# Patient Record
Sex: MALE | Race: WHITE | NOT HISPANIC OR LATINO
[De-identification: names, ages, dates, MRNs, and addresses within clinical notes are randomized per-mention and may not be internally consistent; named-entity substitution may affect disease eponyms.]

---

## 2018-04-26 PROBLEM — Z00.00 ENCOUNTER FOR PREVENTIVE HEALTH EXAMINATION: Status: ACTIVE | Noted: 2018-04-26

## 2018-05-02 ENCOUNTER — APPOINTMENT (OUTPATIENT)
Dept: OTOLARYNGOLOGY | Facility: CLINIC | Age: 28
End: 2018-05-02
Payer: COMMERCIAL

## 2018-05-02 VITALS
TEMPERATURE: 97.3 F | DIASTOLIC BLOOD PRESSURE: 78 MMHG | SYSTOLIC BLOOD PRESSURE: 119 MMHG | BODY MASS INDEX: 22.4 KG/M2 | HEIGHT: 71 IN | OXYGEN SATURATION: 99 % | WEIGHT: 160 LBS | HEART RATE: 61 BPM

## 2018-05-02 DIAGNOSIS — Z81.1 FAMILY HISTORY OF ALCOHOL ABUSE AND DEPENDENCE: ICD-10-CM

## 2018-05-02 DIAGNOSIS — Z86.69 PERSONAL HISTORY OF OTHER DISEASES OF THE NERVOUS SYSTEM AND SENSE ORGANS: ICD-10-CM

## 2018-05-02 DIAGNOSIS — Z80.9 FAMILY HISTORY OF MALIGNANT NEOPLASM, UNSPECIFIED: ICD-10-CM

## 2018-05-02 DIAGNOSIS — H93.8X9 OTHER SPECIFIED DISORDERS OF EAR, UNSPECIFIED EAR: ICD-10-CM

## 2018-05-02 DIAGNOSIS — Z78.9 OTHER SPECIFIED HEALTH STATUS: ICD-10-CM

## 2018-05-02 PROCEDURE — 92557 COMPREHENSIVE HEARING TEST: CPT

## 2018-05-02 PROCEDURE — 92550 TYMPANOMETRY & REFLEX THRESH: CPT

## 2018-05-02 PROCEDURE — 99204 OFFICE O/P NEW MOD 45 MIN: CPT | Mod: 25

## 2018-05-02 PROCEDURE — 69220 CLEAN OUT MASTOID CAVITY: CPT

## 2019-01-08 ENCOUNTER — APPOINTMENT (OUTPATIENT)
Dept: OTOLARYNGOLOGY | Facility: CLINIC | Age: 29
End: 2019-01-08
Payer: COMMERCIAL

## 2019-01-08 VITALS
HEART RATE: 63 BPM | OXYGEN SATURATION: 99 % | SYSTOLIC BLOOD PRESSURE: 122 MMHG | TEMPERATURE: 98 F | HEIGHT: 71 IN | WEIGHT: 160 LBS | BODY MASS INDEX: 22.4 KG/M2 | DIASTOLIC BLOOD PRESSURE: 75 MMHG

## 2019-01-08 PROCEDURE — 99214 OFFICE O/P EST MOD 30 MIN: CPT | Mod: 25

## 2019-01-08 PROCEDURE — 69220 CLEAN OUT MASTOID CAVITY: CPT

## 2019-01-08 NOTE — HISTORY OF PRESENT ILLNESS
[de-identified] : R ear cholesteatoma excision w/ OCR age 16 w/ recurrence less than a year later with no trouble since then until a few months ago when he began hearing "crinkling" noises and an underwater sensation in the R ear (which resolved 3d ago). Longstanding nonpulsatile R side tinnitus and hearing is not as good as the left. Returns for recheck and mastoid cleaning.

## 2019-01-08 NOTE — PHYSICAL EXAM
[Binocular Microscopic Exam] : Binocular microscopic exam was performed [Normal] : the left middle ear was normal [FreeTextEntry8] : shallow mastoid bowl cleaned of cerumen w/ a hook [de-identified] : head of prosthesis visible against the TM

## 2019-07-09 ENCOUNTER — APPOINTMENT (OUTPATIENT)
Dept: OTOLARYNGOLOGY | Facility: CLINIC | Age: 29
End: 2019-07-09

## 2019-12-05 ENCOUNTER — APPOINTMENT (OUTPATIENT)
Dept: OTOLARYNGOLOGY | Facility: CLINIC | Age: 29
End: 2019-12-05
Payer: COMMERCIAL

## 2019-12-05 VITALS
WEIGHT: 160 LBS | HEIGHT: 71 IN | BODY MASS INDEX: 22.4 KG/M2 | OXYGEN SATURATION: 98 % | DIASTOLIC BLOOD PRESSURE: 88 MMHG | TEMPERATURE: 97.9 F | HEART RATE: 63 BPM | SYSTOLIC BLOOD PRESSURE: 138 MMHG

## 2019-12-05 PROCEDURE — 69220 CLEAN OUT MASTOID CAVITY: CPT

## 2019-12-05 PROCEDURE — 99214 OFFICE O/P EST MOD 30 MIN: CPT | Mod: 25

## 2019-12-05 PROCEDURE — 92550 TYMPANOMETRY & REFLEX THRESH: CPT

## 2019-12-05 PROCEDURE — 92557 COMPREHENSIVE HEARING TEST: CPT

## 2019-12-05 NOTE — HISTORY OF PRESENT ILLNESS
[de-identified] : R ear cholesteatoma excision w/ OCR age 16 w/ recurrence less than a year later. Longstanding nonpulsatile R side tinnitus and hearing is not as good as the left. Returns for recheck and mastoid cleaning- no changes, pain, drainage or other complaints.

## 2019-12-05 NOTE — PHYSICAL EXAM
[Binocular Microscopic Exam] : Binocular microscopic exam was performed [Normal] : the left middle ear was normal [FreeTextEntry8] : shallow mastoid bowl cleaned of cerumen w/ a hook & suction [de-identified] : head of prosthesis visible against the TM

## 2020-10-06 ENCOUNTER — APPOINTMENT (OUTPATIENT)
Dept: OTOLARYNGOLOGY | Facility: CLINIC | Age: 30
End: 2020-10-06
Payer: COMMERCIAL

## 2020-10-06 VITALS
SYSTOLIC BLOOD PRESSURE: 118 MMHG | HEIGHT: 71 IN | TEMPERATURE: 96.5 F | DIASTOLIC BLOOD PRESSURE: 72 MMHG | HEART RATE: 64 BPM | BODY MASS INDEX: 21.7 KG/M2 | WEIGHT: 155 LBS | OXYGEN SATURATION: 98 %

## 2020-10-06 PROCEDURE — 92557 COMPREHENSIVE HEARING TEST: CPT

## 2020-10-06 PROCEDURE — 99214 OFFICE O/P EST MOD 30 MIN: CPT | Mod: 25

## 2020-10-06 PROCEDURE — 92550 TYMPANOMETRY & REFLEX THRESH: CPT

## 2020-10-06 PROCEDURE — 69222 CLEAN OUT MASTOID CAVITY: CPT

## 2020-10-06 NOTE — DATA REVIEWED
[de-identified] : 5/18: slight R CHL & R HFHL; type C AD, A AS\par 12/19: stable\par today: stable

## 2020-10-06 NOTE — PHYSICAL EXAM
[Binocular Microscopic Exam] : Binocular microscopic exam was performed [Normal] : the left middle ear was normal [FreeTextEntry8] : mastoid bowl cleaned of cerumen w/ a hook & suction; additional cerumen directly on the TM removed after pretx w/ H2O2 [de-identified] : head of prosthesis visible against the TM. Posterior retraction pocket apposed to bluish structure (? sigmoid); this was well everted w/ a valsalva

## 2020-10-06 NOTE — HISTORY OF PRESENT ILLNESS
[de-identified] : R ear cholesteatoma excision w/ OCR age 16 w/ recurrence less than a year later. Longstanding nonpulsatile R side tinnitus and hearing is not as good as the left. Today he presents a new complaint of ~3 wks of R side pulsatile tinnitus that resolves with a valsalva. Also for a mastoid cleaning- no pain, drainage or other complaints. \par \par Covid-19 screening questions: \par   Sick contacts: no\par   Recent international travel: no\par   Shortness of breath: no\par   New or productive cough: no\par   Fevers/chills/night sweats: no\par   COVID-19 testing: neg swab 9/20 & Ab in the past\par

## 2020-10-06 NOTE — ASSESSMENT
[FreeTextEntry1] : Discussed today's findings which includes possible apposition of a posterior retraction pocket onto an exposed sigmoid sinus; CT temporal bone & RTC

## 2020-11-24 ENCOUNTER — TRANSCRIPTION ENCOUNTER (OUTPATIENT)
Age: 30
End: 2020-11-24

## 2021-01-21 ENCOUNTER — APPOINTMENT (OUTPATIENT)
Dept: OTOLARYNGOLOGY | Facility: CLINIC | Age: 31
End: 2021-01-21
Payer: COMMERCIAL

## 2021-01-21 VITALS
OXYGEN SATURATION: 98 % | HEIGHT: 71 IN | WEIGHT: 155 LBS | DIASTOLIC BLOOD PRESSURE: 73 MMHG | BODY MASS INDEX: 21.7 KG/M2 | SYSTOLIC BLOOD PRESSURE: 116 MMHG | TEMPERATURE: 97.3 F | HEART RATE: 76 BPM

## 2021-01-21 PROCEDURE — 99072 ADDL SUPL MATRL&STAF TM PHE: CPT

## 2021-01-21 PROCEDURE — 69220 CLEAN OUT MASTOID CAVITY: CPT

## 2021-01-21 PROCEDURE — 99214 OFFICE O/P EST MOD 30 MIN: CPT | Mod: 25

## 2021-01-21 NOTE — DATA REVIEWED
[de-identified] : 5/18: slight R CHL & R HFHL; type C AD, A AS\par 12/19: stable\par 10/20: stable [de-identified] : CT t-bones 10/20 reviewed primarily w/ pt: s/p R CWU mastoid w/ intact OCR

## 2021-01-21 NOTE — PHYSICAL EXAM
[Binocular Microscopic Exam] : Binocular microscopic exam was performed [Normal] : the left middle ear was normal [FreeTextEntry8] : mastoid bowl cleaned of cerumen w/ a hook & suction [de-identified] : head of prosthesis visible against the TM.

## 2021-01-21 NOTE — HISTORY OF PRESENT ILLNESS
[de-identified] : R ear cholesteatoma excision w/ OCR age 16 w/ recurrence less than a year later. Longstanding nonpulsatile R side tinnitus and hearing is not as good as the left. Now f/u CT for 4 months of short-lived R side pulsatile tinnitus that resolves with a valsalva; this is unchanged. Also for a mastoid cleaning- no pain, drainage or other complaints. \par \par Covid-19 screening questions: \par   Sick contacts: no\par   Recent international travel: no\par   Shortness of breath: no\par   New or productive cough: no\par   Fevers/chills/night sweats: no\par   COVID-19 testing: neg swab 9/20 & Ab in the past\par

## 2021-01-21 NOTE — ASSESSMENT
[FreeTextEntry1] : Stable pulsatile tinnitus w/ nl CT & sxs resolved w/ Valsalva; will continue to follow. RTC 6 months for f/u audio & cleaning

## 2021-11-22 ENCOUNTER — APPOINTMENT (OUTPATIENT)
Dept: OTOLARYNGOLOGY | Facility: CLINIC | Age: 31
End: 2021-11-22

## 2022-01-27 ENCOUNTER — APPOINTMENT (OUTPATIENT)
Dept: OTOLARYNGOLOGY | Facility: CLINIC | Age: 32
End: 2022-01-27
Payer: MEDICAID

## 2022-01-27 VITALS
SYSTOLIC BLOOD PRESSURE: 130 MMHG | HEART RATE: 56 BPM | HEIGHT: 71 IN | DIASTOLIC BLOOD PRESSURE: 85 MMHG | OXYGEN SATURATION: 100 % | BODY MASS INDEX: 23.1 KG/M2 | TEMPERATURE: 96.9 F | WEIGHT: 165 LBS

## 2022-01-27 PROCEDURE — 69222 CLEAN OUT MASTOID CAVITY: CPT

## 2022-01-27 PROCEDURE — 99214 OFFICE O/P EST MOD 30 MIN: CPT | Mod: 25

## 2022-01-27 NOTE — HISTORY OF PRESENT ILLNESS
[de-identified] : R ear cholesteatoma excision w/ OCR age 16 w/ recurrence less than a year later. Longstanding nonpulsatile R side tinnitus and hearing is not as good as the left- this seems stable. Ongoing stable short-lived R side pulsatile tinnitus since 2019 that resolves with a valsalva; this is unchanged. Also for a mastoid cleaning- no pain, drainage or other complaints. \par

## 2022-01-27 NOTE — ASSESSMENT
[FreeTextEntry1] : Discussed options including ongoing monitoring but given concern re: recurrent cholesteatoma I asked him to seek an opinon from Dr. Davis.

## 2022-01-27 NOTE — DATA REVIEWED
[de-identified] : 5/18: slight R CHL & R HFHL; type C AD, A AS\par 12/19: stable\par 10/20: stable [de-identified] : CT t-bones 10/20: s/p R CWU mastoid w/ intact OCR

## 2022-02-01 ENCOUNTER — APPOINTMENT (OUTPATIENT)
Dept: OTOLARYNGOLOGY | Facility: CLINIC | Age: 32
End: 2022-02-01

## 2022-02-22 ENCOUNTER — APPOINTMENT (OUTPATIENT)
Dept: OTOLARYNGOLOGY | Facility: CLINIC | Age: 32
End: 2022-02-22
Payer: MEDICAID

## 2022-02-22 VITALS
BODY MASS INDEX: 23.1 KG/M2 | HEIGHT: 71 IN | SYSTOLIC BLOOD PRESSURE: 112 MMHG | HEART RATE: 75 BPM | WEIGHT: 165 LBS | TEMPERATURE: 94.2 F | OXYGEN SATURATION: 99 % | DIASTOLIC BLOOD PRESSURE: 69 MMHG

## 2022-02-22 DIAGNOSIS — H61.21 IMPACTED CERUMEN, RIGHT EAR: ICD-10-CM

## 2022-02-22 DIAGNOSIS — H71.91 UNSPECIFIED CHOLESTEATOMA, RIGHT EAR: ICD-10-CM

## 2022-02-22 DIAGNOSIS — H93.A1 PULSATILE TINNITUS, RIGHT EAR: ICD-10-CM

## 2022-02-22 DIAGNOSIS — H90.11 CONDUCTIVE HEARING LOSS, UNILATERAL, RIGHT EAR, WITH UNRESTRICTED HEARING ON THE CONTRALATERAL SIDE: ICD-10-CM

## 2022-02-22 PROCEDURE — 92557 COMPREHENSIVE HEARING TEST: CPT

## 2022-02-22 PROCEDURE — 99214 OFFICE O/P EST MOD 30 MIN: CPT | Mod: 25

## 2022-02-22 PROCEDURE — 92550 TYMPANOMETRY & REFLEX THRESH: CPT

## 2022-02-22 PROCEDURE — 69222 CLEAN OUT MASTOID CAVITY: CPT

## 2022-02-22 NOTE — HISTORY OF PRESENT ILLNESS
[de-identified] : R ear cholesteatoma excision w/ OCR age 16 w/ recurrence less than a year later. Longstanding nonpulsatile R side tinnitus and hearing is not as good as the left- he returns with worsening of the tinnitus. No further pulsatile tinnitus since last seen. No pain, drainage or other complaints. \par

## 2022-02-22 NOTE — DATA REVIEWED
[de-identified] : 5/18: slight R CHL & R HFHL; type C AD, A AS\par 12/19: stable\par 10/20: stable\par today: stable [de-identified] : CT t-bones 10/20: s/p R CWD mastoid w/ intact OCR

## 2022-02-22 NOTE — ASSESSMENT
[FreeTextEntry1] : Discussed today's findings despite his mostly unaffected hearing; he has an otology appt set up and will likely need a JOESPH.

## 2022-02-22 NOTE — PHYSICAL EXAM
[Binocular Microscopic Exam] : Binocular microscopic exam was performed [Normal] : the right external canal was normal [de-identified] : head of prosthesis visible against the TM. At the pars flaccida a deep retraction or perf is present filled w/ keratin debris- incomplete cleaning performed w/ suction & hook d/t the depth of the keratin

## 2022-09-13 NOTE — PHYSICAL EXAM
[Binocular Microscopic Exam] : Binocular microscopic exam was performed [Normal] : the left middle ear was normal [FreeTextEntry8] : mastoid bowl cleaned of cerumen w/ a hook & suction [de-identified] : head of prosthesis visible against the TM. At the ? pars flaccida area there is a stubborn deep area of keratin that couldn't be removed w/ suction and hook even after prolonged H2O2 Quality 130: Documentation Of Current Medications In The Medical Record: Current Medications Documented Detail Level: Detailed Quality 431: Preventive Care And Screening: Unhealthy Alcohol Use - Screening: Unhealthy alcohol use screening not performed, reason not otherwise specified Quality 110: Preventive Care And Screening: Influenza Immunization: Influenza Immunization Administered during Influenza season Quality 226: Preventive Care And Screening: Tobacco Use: Screening And Cessation Intervention: Patient screened for tobacco use and is an ex/non-smoker